# Patient Record
Sex: FEMALE | Race: WHITE | NOT HISPANIC OR LATINO
[De-identification: names, ages, dates, MRNs, and addresses within clinical notes are randomized per-mention and may not be internally consistent; named-entity substitution may affect disease eponyms.]

---

## 2020-07-20 PROBLEM — Z00.00 ENCOUNTER FOR PREVENTIVE HEALTH EXAMINATION: Status: ACTIVE | Noted: 2020-07-20

## 2020-07-21 ENCOUNTER — APPOINTMENT (OUTPATIENT)
Dept: ENDOCRINOLOGY | Facility: CLINIC | Age: 60
End: 2020-07-21
Payer: MEDICAID

## 2020-07-21 VITALS — HEIGHT: 66.5 IN | OXYGEN SATURATION: 98 % | HEART RATE: 78 BPM | WEIGHT: 293 LBS | BODY MASS INDEX: 46.53 KG/M2

## 2020-07-21 DIAGNOSIS — Z83.3 FAMILY HISTORY OF DIABETES MELLITUS: ICD-10-CM

## 2020-07-21 DIAGNOSIS — Z72.3 LACK OF PHYSICAL EXERCISE: ICD-10-CM

## 2020-07-21 DIAGNOSIS — Z80.9 FAMILY HISTORY OF MALIGNANT NEOPLASM, UNSPECIFIED: ICD-10-CM

## 2020-07-21 DIAGNOSIS — Z60.2 PROBLEMS RELATED TO LIVING ALONE: ICD-10-CM

## 2020-07-21 DIAGNOSIS — Z63.4 DISAPPEARANCE AND DEATH OF FAMILY MEMBER: ICD-10-CM

## 2020-07-21 LAB — GLUCOSE BLDC GLUCOMTR-MCNC: 293

## 2020-07-21 PROCEDURE — 99205 OFFICE O/P NEW HI 60 MIN: CPT | Mod: 25

## 2020-07-21 PROCEDURE — 82962 GLUCOSE BLOOD TEST: CPT

## 2020-07-21 RX ORDER — ATORVASTATIN CALCIUM 80 MG/1
TABLET, FILM COATED ORAL
Refills: 0 | Status: ACTIVE | COMMUNITY

## 2020-07-21 SDOH — SOCIAL STABILITY - SOCIAL INSECURITY: PROBLEMS RELATED TO LIVING ALONE: Z60.2

## 2020-07-21 SDOH — SOCIAL STABILITY - SOCIAL INSECURITY: DISSAPEARANCE AND DEATH OF FAMILY MEMBER: Z63.4

## 2020-07-22 PROBLEM — Z83.3 FAMILY HISTORY OF DIABETES MELLITUS: Status: ACTIVE | Noted: 2020-07-21

## 2020-07-22 PROBLEM — Z80.9 FAMILY HISTORY OF MALIGNANT NEOPLASM: Status: ACTIVE | Noted: 2020-07-21

## 2020-07-22 PROBLEM — Z60.2 LIVES ALONE: Status: ACTIVE | Noted: 2020-07-21

## 2020-07-22 PROBLEM — Z63.4 WIDOWED: Status: ACTIVE | Noted: 2020-07-21

## 2020-07-22 PROBLEM — Z72.3 DOES NOT EXERCISE: Status: ACTIVE | Noted: 2020-07-21

## 2020-07-22 LAB — HBA1C MFR BLD HPLC: 11

## 2020-07-22 NOTE — HISTORY OF PRESENT ILLNESS
[FreeTextEntry1] : Cecilia Santana is a 60 yr old female, who presents today for a follow up/consultation in regards type 2 diabetes.  \par Per patient , has been diabetic since 10 years.\par She says for long time she did not take good care of her diabetes, she knows what to do but she has not been careful.\par Now she has surgery coming up and wants her sugars to be better before that.\par \par Currently taking lanuts 45 units daily, novolog on sliding scale, 4 to 14 units with each meals.\par admits to missing insulin doses a lot.\par A1C 11% in 7/2020\par \par \par Home Glucose Monitoring\par Type: meter type 4 x day\par BG Readings -   Fasting 250s to 260\par                              Lunch   300s\par                              Dinner 300s\par                               HS        300s\par no lows\par Says her diet is getting better.Cut back on bread, rice. \par Since 2 weeks working on diet.\par \par Diabetic History\par ER Visits:  none\par Hospitalizations:   none\par Diet Therapy: now watching his diet\par Diabetic Education:   no\par Exercise:   not active, admits to being lazy\par Last eye exam: gets yearly, no retinopathy per patient, last one was 1 years, next one coming up .\par \par \par \par \par

## 2020-07-22 NOTE — REVIEW OF SYSTEMS
[Decreased Appetite] : appetite not decreased [Recent Weight Gain (___ Lbs)] : no recent weight gain [Fatigue] : no fatigue [Recent Weight Loss (___ Lbs)] : no recent weight loss [Visual Field Defect] : no visual field defect [Dry Eyes] : no dryness [Neck Pain] : no neck pain [Dysphonia] : no dysphonia [Dysphagia] : no dysphagia [Palpitations] : no palpitations [Chest Pain] : no chest pain [Lower Ext Edema] : no lower extremity edema [Wheezing] : no wheezing [Cough] : no cough [Shortness Of Breath] : no shortness of breath [Constipation] : no constipation [Nausea] : no nausea [Abdominal Pain] : no abdominal pain [Diarrhea] : no diarrhea [Polyuria] : no polyuria [Vomiting] : no vomiting [Muscle Weakness] : no muscle weakness [Joint Pain] : no joint pain [Dysuria] : no dysuria [Myalgia] : no myalgia  [Dizziness] : no dizziness [Headaches] : no headaches [Tremors] : no tremors [Depression] : no depression [Insomnia] : no insomnia [Anxiety] : no anxiety [Easy Bleeding] : no ~M tendency for easy bleeding [Easy Bruising] : no tendency for easy bruising

## 2020-07-22 NOTE — PHYSICAL EXAM
[Alert] : alert [Well Nourished] : well nourished [No Proptosis] : no proptosis [Normal Sclera/Conjunctiva] : normal sclera/conjunctiva [No Acute Distress] : no acute distress [No Neck Mass] : no neck mass was observed [No Lid Lag] : no lid lag [No LAD] : no lymphadenopathy [Thyroid Not Enlarged] : the thyroid was not enlarged [No Respiratory Distress] : no respiratory distress [No Thyroid Nodules] : no palpable thyroid nodules [Clear to Auscultation] : lungs were clear to auscultation bilaterally [No Accessory Muscle Use] : no accessory muscle use [Normal Rate and Effort] : normal respiratory rate and effort [No Murmurs] : no murmurs [Normal S1, S2] : normal S1 and S2 [Normal Rate] : heart rate was normal [Regular Rhythm] : with a regular rhythm [Normal Bowel Sounds] : normal bowel sounds [Not Tender] : non-tender [Normal Supraclavicular Nodes] : no supraclavicular lymphadenopathy [Soft] : abdomen soft [Not Distended] : not distended [Normal Posterior Cervical Nodes] : no posterior cervical lymphadenopathy [Normal Anterior Cervical Nodes] : no anterior cervical lymphadenopathy [No Stigmata of Cushings Syndrome] : no stigmata of Cushings Syndrome [Normal Gait] : normal gait [No Joint Swelling] : no joint swelling seen [No Clubbing, Cyanosis] : no clubbing  or cyanosis of the fingernails [No Tremors] : no tremors [No Sensory Deficits] : the sensory exam was normal to light touch and pinprick [No Motor Deficits] : the motor exam was normal [Oriented x3] : oriented to person, place, and time [Normal Affect] : the affect was normal [Normal Insight/Judgement] : insight and judgment were intact [Normal Mood] : the mood was normal [Obese] : obese [Left Foot Was Examined] : left foot ~C was examined [Right Foot Was Examined] : right foot ~C was examined [Normal] : normal [Diminished Throughout Both Feet] : normal tactile sensation with monofilament testing throughout both feet

## 2020-07-22 NOTE — ASSESSMENT
[FreeTextEntry1] : Cecilia Santana is a 60 yr old female, who presents today for consultation in regards type 2 diabetes, under poor control.  \par Currently taking lanuts 45 units daily, novolog on sliding scale, 4 to 14 units with each meals.\par admits to missing insulin doses a lot.\par A1C 11% in 7/2020  she has surgery coming up and wants her sugars to be better before that.\par \par Medication changes:\par To take lantus 50 units daily, novolog 10 units each meal\par To check sugars 4 times a day, morning, bedtime and before each meal.\par Bring log to next visit.\par For every 50 points above 150 take 2 extra units as below\par  To check sugars 4 x a day  at different times.\par Diet and exercise reviewed.\par Hypoglycemia reviewed.\par To see CDE in 2 to 3 weeks for furthewr dose titration.\par \par  Eyes:no  active issues, up to date for exams. coming up for this year\par Feet: no  active issues, no  neuropathy. Diabetic foot care reviewed.\par \par  Lipids: dyslipidemia: on  statin/ anti-lipid treatment. Last LDL:  not known, will check next visit , once sugars are better\par  Renal/ B/P : B/P wnl, on ACE/ ARB. will check for  proteinuria.  \par Weight:  obese  Balanced diet and exercise reviewed.\par \par  EDUCATION: ADA diet (30-50 gm carbohydrates with each meal, 15 grams with snacks. Balance with lean proteins and low fat diet.) Exercise daily per ability, work up to 30 minutes a day. Medication, risks and benefits reviewed. Glucose testing and insulin administration for glycemic management.\par  Influenza vaccination \par \par FOLLOWUP@3 months\par

## 2020-08-03 ENCOUNTER — APPOINTMENT (OUTPATIENT)
Dept: ENDOCRINOLOGY | Facility: CLINIC | Age: 60
End: 2020-08-03

## 2020-08-18 ENCOUNTER — APPOINTMENT (OUTPATIENT)
Dept: ENDOCRINOLOGY | Facility: CLINIC | Age: 60
End: 2020-08-18
Payer: MEDICAID

## 2020-08-18 PROCEDURE — G0108 DIAB MANAGE TRN  PER INDIV: CPT

## 2020-09-13 ENCOUNTER — NON-APPOINTMENT (OUTPATIENT)
Age: 60
End: 2020-09-13

## 2020-09-17 RX ORDER — INSULIN GLARGINE 100 [IU]/ML
100 INJECTION, SOLUTION SUBCUTANEOUS
Refills: 0 | Status: DISCONTINUED | COMMUNITY
End: 2020-09-17

## 2020-09-17 RX ORDER — INSULIN ASPART 100 [IU]/ML
100 INJECTION, SOLUTION INTRAVENOUS; SUBCUTANEOUS
Qty: 6 | Refills: 0 | Status: DISCONTINUED | COMMUNITY
Start: 2020-08-18 | End: 2020-09-17

## 2020-09-17 RX ORDER — INSULIN GLARGINE 100 [IU]/ML
100 INJECTION, SOLUTION SUBCUTANEOUS
Qty: 5 | Refills: 0 | Status: DISCONTINUED | COMMUNITY
Start: 2020-08-18 | End: 2020-09-17

## 2020-09-22 ENCOUNTER — APPOINTMENT (OUTPATIENT)
Dept: ENDOCRINOLOGY | Facility: CLINIC | Age: 60
End: 2020-09-22
Payer: MEDICAID

## 2020-09-22 PROCEDURE — G0108 DIAB MANAGE TRN  PER INDIV: CPT

## 2020-09-22 RX ORDER — PEN NEEDLE, DIABETIC 32GX 5/32"
32G X 4 MM NEEDLE, DISPOSABLE MISCELLANEOUS
Qty: 90 | Refills: 0 | Status: DISCONTINUED | COMMUNITY
Start: 2020-09-17 | End: 2020-09-22

## 2020-09-22 RX ORDER — INSULIN DEGLUDEC INJECTION 100 U/ML
100 INJECTION, SOLUTION SUBCUTANEOUS
Qty: 5 | Refills: 0 | Status: DISCONTINUED | COMMUNITY
Start: 2020-09-17 | End: 2020-09-22

## 2020-10-05 ENCOUNTER — APPOINTMENT (OUTPATIENT)
Dept: ENDOCRINOLOGY | Facility: CLINIC | Age: 60
End: 2020-10-05

## 2020-10-20 ENCOUNTER — APPOINTMENT (OUTPATIENT)
Dept: ENDOCRINOLOGY | Facility: CLINIC | Age: 60
End: 2020-10-20

## 2020-11-03 ENCOUNTER — APPOINTMENT (OUTPATIENT)
Dept: ENDOCRINOLOGY | Facility: CLINIC | Age: 60
End: 2020-11-03

## 2020-12-18 ENCOUNTER — RX CHANGE (OUTPATIENT)
Age: 60
End: 2020-12-18

## 2020-12-21 ENCOUNTER — RX CHANGE (OUTPATIENT)
Age: 60
End: 2020-12-21

## 2020-12-29 ENCOUNTER — RX CHANGE (OUTPATIENT)
Age: 60
End: 2020-12-29

## 2021-01-31 ENCOUNTER — RX RENEWAL (OUTPATIENT)
Age: 61
End: 2021-01-31

## 2021-03-15 ENCOUNTER — RX RENEWAL (OUTPATIENT)
Age: 61
End: 2021-03-15

## 2021-04-12 ENCOUNTER — APPOINTMENT (OUTPATIENT)
Dept: ENDOCRINOLOGY | Facility: CLINIC | Age: 61
End: 2021-04-12

## 2021-06-08 ENCOUNTER — RX RENEWAL (OUTPATIENT)
Age: 61
End: 2021-06-08

## 2021-07-06 ENCOUNTER — APPOINTMENT (OUTPATIENT)
Dept: ENDOCRINOLOGY | Facility: CLINIC | Age: 61
End: 2021-07-06
Payer: MEDICAID

## 2021-07-06 DIAGNOSIS — E66.9 OBESITY, UNSPECIFIED: ICD-10-CM

## 2021-07-06 PROCEDURE — 99214 OFFICE O/P EST MOD 30 MIN: CPT | Mod: 95

## 2021-07-06 RX ORDER — BLOOD SUGAR DIAGNOSTIC
STRIP MISCELLANEOUS
Qty: 100 | Refills: 0 | Status: ACTIVE | COMMUNITY
Start: 2021-06-21

## 2021-07-06 NOTE — REVIEW OF SYSTEMS
[Fatigue] : no fatigue [Decreased Appetite] : appetite not decreased [Recent Weight Gain (___ Lbs)] : no recent weight gain [Recent Weight Loss (___ Lbs)] : no recent weight loss [Visual Field Defect] : no visual field defect [Dry Eyes] : no dryness [Dysphagia] : no dysphagia [Neck Pain] : no neck pain [Dysphonia] : no dysphonia [Chest Pain] : no chest pain [Palpitations] : no palpitations [Lower Ext Edema] : no lower extremity edema [Shortness Of Breath] : no shortness of breath [Cough] : no cough [Wheezing] : no wheezing [Nausea] : no nausea [Constipation] : no constipation [Abdominal Pain] : no abdominal pain [Vomiting] : no vomiting [Diarrhea] : no diarrhea [Polyuria] : no polyuria [Dysuria] : no dysuria [Joint Pain] : no joint pain [Muscle Weakness] : no muscle weakness [Myalgia] : no myalgia  [Headaches] : no headaches [Dizziness] : no dizziness [Tremors] : no tremors [Depression] : no depression [Insomnia] : no insomnia [Anxiety] : no anxiety [Easy Bleeding] : no ~M tendency for easy bleeding [Easy Bruising] : no tendency for easy bruising

## 2021-07-06 NOTE — HISTORY OF PRESENT ILLNESS
[Home] : at home, [unfilled] , at the time of the visit. [Medical Office: ([facility])___] : at the medical office located in  [Verbal consent obtained from patient] : the patient, [unfilled] [FreeTextEntry1] : Cecilia Santana is a 60 yr old female, who presents today for a follow up in regards type 2 diabetes.  \par \par ''This visit was provided via telehealth video  visit, patient was located at her home.\par  I was located  in  my office  at [facility], [place].\par verbal  consent was given by patient, and patient agreed/requested  to the telehealth visit.''\par \par She tells me recently she was diagnosed with uterine cancer.\par \par \par Per patient , has been diabetic since 10 years.\par She says for long time she did not take good care of her diabetes, she knows what to do but she has not been careful.\par Now she has surgery coming up and wants her sugars to be better before that.\par \par Currently taking basaglar 50 units daily, admelog  20 units with each meals plus sliding scale.\par A1C 11% in 7/2020, per patient recent a1c was round 10% she is not sure.\par \par \par Home Glucose Monitoring\par reports checking  4 x day\par BG Readings -   Fasting 200s to 260s\par                              Lunch  200s\par                              Dinner 150s to 220s\par                               HS        200s\par no lows\par Says her diet is getting better.Cut back on bread, rice. \par \par Diabetic History\par ER Visits:  none\par Hospitalizations:   none\par Diet Therapy: now watching his diet\par Diabetic Education:   no\par Exercise:   not active, admits to being lazy\par Last eye exam: gets yearly, has some  retinopathy per patient, last one was 7 months ago.\par \par \par \par \par

## 2021-07-06 NOTE — ASSESSMENT
[FreeTextEntry1] : Cecilia Santana is a 60 yr old female, who presents today for a follow up in regards type 2 diabetes.  \par \par ''This visit was provided via telehealth video  visit, patient was located at her home.\par  I was located  in  my office  at [facility].\par verbal  consent was given by patient, and patient agreed/requested  to the telehealth visit.''\par \par She tells me recently she was diagnosed with uterine cancer.\par \par Per patient , has been diabetic since 10 years.\par She says for long time she did not take good care of her diabetes, she knows what to do but she has not been careful.\par Now she has surgery coming up and wants her sugars to be better before that.\par \par Currently taking basaglar 50 units daily, admelog  20 units with each meals plus sliding scale.\par A1C 11% in 7/2020, per patient recent a1c was round 10% she is not sure.\par Sugars still high, recommended to see us regularly so we can help her adjust insulin doses.\par Discussed risks of high sugars.\par Already has retinopathy.\par \par Medication changes:\par To take basaglar 60  units daily, admelog 25 units each meal \par To check sugars 4 times a day, morning, bedtime and before each meal.\par To see CDE in 2 to 3 weeks for further dose titration.\par Bring log to next visit.\par For every 50 points above 150 take 2 extra units as below\par  To check sugars 4 x a day  at different times.\par Diet and exercise reviewed.\par Hypoglycemia reviewed.\par \par \par  Eyes:no  active issues, up to date for exams. has retinopathy.\par Feet: no  active issues, no  neuropathy. Diabetic foot care reviewed.\par \par  Lipids: dyslipidemia: on  statin/ anti-lipid treatment. Last LDL:  not known, will check this  visit \par  Renal/ B/P : B/P wnl, on ACE/ ARB. will check for  proteinuria.  \par Weight:  obese  Balanced diet and exercise reviewed.\par \par  EDUCATION: ADA diet (30-50 gm carbohydrates with each meal, 15 grams with snacks. Balance with lean proteins and low fat diet.) Exercise daily per ability, work up to 30 minutes a day. Medication, risks and benefits reviewed. Glucose testing and insulin administration for glycemic management.\par  Influenza vaccination \par \par FOLLOWUP@3 months\par

## 2021-07-21 ENCOUNTER — RX RENEWAL (OUTPATIENT)
Age: 61
End: 2021-07-21

## 2021-08-27 ENCOUNTER — RX RENEWAL (OUTPATIENT)
Age: 61
End: 2021-08-27

## 2021-12-15 ENCOUNTER — APPOINTMENT (OUTPATIENT)
Dept: ENDOCRINOLOGY | Facility: CLINIC | Age: 61
End: 2021-12-15

## 2022-03-09 ENCOUNTER — APPOINTMENT (OUTPATIENT)
Dept: ENDOCRINOLOGY | Facility: CLINIC | Age: 62
End: 2022-03-09

## 2022-06-06 ENCOUNTER — RX RENEWAL (OUTPATIENT)
Age: 62
End: 2022-06-06

## 2022-08-31 ENCOUNTER — NON-APPOINTMENT (OUTPATIENT)
Age: 62
End: 2022-08-31

## 2022-09-01 RX ORDER — PEN NEEDLE, DIABETIC 32GX 5/32"
32G X 4 MM NEEDLE, DISPOSABLE MISCELLANEOUS
Qty: 100 | Refills: 0 | Status: ACTIVE | COMMUNITY
Start: 2022-09-01 | End: 1900-01-01

## 2022-09-01 RX ORDER — SYRINGE-NEEDLE,INSULIN,0.5 ML 31GX15/64"
31G X 15/64" SYRINGE, EMPTY DISPOSABLE MISCELLANEOUS
Qty: 4 | Refills: 0 | Status: DISCONTINUED | COMMUNITY
Start: 2020-08-18 | End: 2022-09-01

## 2022-09-01 RX ORDER — INSULIN ASPART 100 [IU]/ML
100 INJECTION, SOLUTION INTRAVENOUS; SUBCUTANEOUS
Qty: 20 | Refills: 3 | Status: DISCONTINUED | COMMUNITY
Start: 2020-09-22 | End: 2022-09-01

## 2022-09-01 RX ORDER — INSULIN GLARGINE 100 [IU]/ML
100 INJECTION, SOLUTION SUBCUTANEOUS
Qty: 2 | Refills: 2 | Status: DISCONTINUED | COMMUNITY
Start: 2020-09-22 | End: 2022-09-01

## 2022-09-01 RX ORDER — PEN NEEDLE, DIABETIC 29 G X1/2"
31G X 8 MM NEEDLE, DISPOSABLE MISCELLANEOUS
Qty: 200 | Refills: 3 | Status: DISCONTINUED | COMMUNITY
Start: 2020-12-21 | End: 2022-09-01

## 2022-09-13 ENCOUNTER — APPOINTMENT (OUTPATIENT)
Dept: ENDOCRINOLOGY | Facility: CLINIC | Age: 62
End: 2022-09-13

## 2023-07-11 ENCOUNTER — APPOINTMENT (OUTPATIENT)
Dept: ENDOCRINOLOGY | Facility: CLINIC | Age: 63
End: 2023-07-11
Payer: MEDICAID

## 2023-07-11 ENCOUNTER — RESULT CHARGE (OUTPATIENT)
Age: 63
End: 2023-07-11

## 2023-07-11 VITALS
HEIGHT: 66.5 IN | OXYGEN SATURATION: 98 % | WEIGHT: 293 LBS | HEART RATE: 100 BPM | BODY MASS INDEX: 46.53 KG/M2 | SYSTOLIC BLOOD PRESSURE: 122 MMHG | DIASTOLIC BLOOD PRESSURE: 84 MMHG

## 2023-07-11 DIAGNOSIS — Z85.42 PERSONAL HISTORY OF MALIGNANT NEOPLASM OF OTHER PARTS OF UTERUS: ICD-10-CM

## 2023-07-11 LAB — GLUCOSE BLDC GLUCOMTR-MCNC: 314

## 2023-07-11 PROCEDURE — 99215 OFFICE O/P EST HI 40 MIN: CPT

## 2023-07-11 RX ORDER — INSULIN GLARGINE 100 [IU]/ML
100 INJECTION, SOLUTION SUBCUTANEOUS
Qty: 1 | Refills: 1 | Status: DISCONTINUED | COMMUNITY
Start: 2020-09-17 | End: 2023-07-11

## 2023-07-11 RX ORDER — INSULIN LISPRO 100 U/ML
100 INJECTION, SOLUTION SUBCUTANEOUS
Qty: 1 | Refills: 0 | Status: DISCONTINUED | COMMUNITY
Start: 2020-12-18 | End: 2023-07-11

## 2023-07-11 RX ORDER — INSULIN ASPART 100 [IU]/ML
100 INJECTION, SOLUTION INTRAVENOUS; SUBCUTANEOUS
Refills: 0 | Status: DISCONTINUED | COMMUNITY
End: 2023-07-11

## 2023-07-11 RX ORDER — FLUTICASONE PROPIONATE 50 UG/1
50 SPRAY, METERED NASAL
Qty: 16 | Refills: 0 | Status: DISCONTINUED | COMMUNITY
Start: 2021-01-08 | End: 2023-07-11

## 2023-07-11 RX ORDER — ALBUTEROL SULFATE 90 UG/1
108 (90 BASE) INHALANT RESPIRATORY (INHALATION)
Qty: 8 | Refills: 0 | Status: DISCONTINUED | COMMUNITY
Start: 2021-01-08 | End: 2023-07-11

## 2023-07-11 RX ORDER — AMLODIPINE BESYLATE 5 MG/1
TABLET ORAL
Refills: 0 | Status: DISCONTINUED | COMMUNITY
End: 2023-07-11

## 2023-07-11 RX ORDER — PEN NEEDLE, DIABETIC 32GX 5/32"
32G X 4 MM NEEDLE, DISPOSABLE MISCELLANEOUS
Qty: 100 | Refills: 1 | Status: ACTIVE | COMMUNITY
Start: 2023-07-11 | End: 1900-01-01

## 2023-07-11 NOTE — HISTORY OF PRESENT ILLNESS
[FreeTextEntry1] : Here to resume care for uncontrolled Type 2 DM. \par Last seen in 2021.\par Returns as she was recently found to have retinopathy with macular edema and needs a procedure done tomorrow. She is now motivated to improve her diabetes care.\par She does admit for the past 8-9 months she has been off all of her insulin and not testing her blood glucose.  \par \par treated for endometrial cancer  with XRT one year ago. \par \par Quality:  Type 2 DM\par Severity:  uncontrolled\par Duration of diabetes: over 10 years\par Associated Complications/ Symptoms:  retinopathy\par Modifying Factors:  Better with medication\par \par SMBG:  not testing BG currently.  \par \par Current Diabetic Medication Regimen:\par Was previously on basal- bolus insulin.\par Can not tolerate metformin due to diarrhea.   \par \par \par \par \par

## 2023-07-11 NOTE — PHYSICAL EXAM
[Obese] : obese [No Acute Distress] : no acute distress [Normal Sclera/Conjunctiva] : normal sclera/conjunctiva [No Proptosis] : no proptosis [No Neck Mass] : no neck mass was observed [No LAD] : no lymphadenopathy [Supple] : the neck was supple [Thyroid Not Enlarged] : the thyroid was not enlarged [No Thyroid Nodules] : no palpable thyroid nodules [No Respiratory Distress] : no respiratory distress [Clear to Auscultation] : lungs were clear to auscultation bilaterally [Normal S1, S2] : normal S1 and S2 [No Murmurs] : no murmurs [Normal Rate] : heart rate was normal [Regular Rhythm] : with a regular rhythm [No Tremors] : no tremors [Normal Affect] : the affect was normal [Normal Insight/Judgement] : insight and judgment were intact [Normal Mood] : the mood was normal [Acanthosis Nigricans] : no acanthosis nigricans [de-identified] : t

## 2023-07-11 NOTE — ASSESSMENT
[FreeTextEntry1] : 63 year old female with PMH of Type 2 DM with associated retinopathy, HTN, HLD, Obesity and endometrial cancer here for follow up of uncontrolled DM.  \par \par 1. Type 2 DM-  check labs now.  Resume Basaglar 30 units qhs.  Will start Ozempic 0.25 mg qweek for 4 weeks, then increase to 0.5 mg qweek.  Resume SMBG and test 4 times a day.  She is interested in CGM.  Will arrange for diabetes education.   Discussed risks of uncontrolled DM in detail and encouraged patient to increase compliance with diet, monitoring, medication and follow up.  \par \par 2.  HTN-  continue losartan.  Patient to call back with dose.\par 3.  HLD-  continue Atorvastatin.  Patient to call back with current dose. \par \par Follow up in 3 months.   \par \par \par Spent 45 minutes on this encounter with significant time spent counseling patient on DM care and reviewing chart and documenting.

## 2023-07-18 ENCOUNTER — APPOINTMENT (OUTPATIENT)
Dept: ENDOCRINOLOGY | Facility: CLINIC | Age: 63
End: 2023-07-18

## 2023-11-08 ENCOUNTER — APPOINTMENT (OUTPATIENT)
Dept: ENDOCRINOLOGY | Facility: CLINIC | Age: 63
End: 2023-11-08

## 2024-03-27 ENCOUNTER — APPOINTMENT (OUTPATIENT)
Dept: ENDOCRINOLOGY | Facility: CLINIC | Age: 64
End: 2024-03-27
Payer: MEDICAID

## 2024-03-27 VITALS
OXYGEN SATURATION: 98 % | RESPIRATION RATE: 18 BRPM | BODY MASS INDEX: 46.53 KG/M2 | HEIGHT: 66.5 IN | SYSTOLIC BLOOD PRESSURE: 134 MMHG | WEIGHT: 293 LBS | DIASTOLIC BLOOD PRESSURE: 88 MMHG | HEART RATE: 108 BPM

## 2024-03-27 DIAGNOSIS — Z79.4 TYPE 2 DIABETES MELLITUS WITH HYPERGLYCEMIA: ICD-10-CM

## 2024-03-27 DIAGNOSIS — E11.65 TYPE 2 DIABETES MELLITUS WITH HYPERGLYCEMIA: ICD-10-CM

## 2024-03-27 DIAGNOSIS — Z86.73 PERSONAL HISTORY OF TRANSIENT ISCHEMIC ATTACK (TIA), AND CEREBRAL INFARCTION W/OUT RESIDUAL DEFICITS: ICD-10-CM

## 2024-03-27 DIAGNOSIS — E78.5 HYPERLIPIDEMIA, UNSPECIFIED: ICD-10-CM

## 2024-03-27 DIAGNOSIS — I10 ESSENTIAL (PRIMARY) HYPERTENSION: ICD-10-CM

## 2024-03-27 LAB — GLUCOSE BLDC GLUCOMTR-MCNC: 346

## 2024-03-27 PROCEDURE — 99214 OFFICE O/P EST MOD 30 MIN: CPT

## 2024-03-27 PROCEDURE — 82962 GLUCOSE BLOOD TEST: CPT

## 2024-03-27 RX ORDER — LOSARTAN POTASSIUM 100 MG/1
TABLET, FILM COATED ORAL
Refills: 0 | Status: DISCONTINUED | COMMUNITY
End: 2024-03-27

## 2024-03-27 RX ORDER — ATORVASTATIN CALCIUM 40 MG/1
40 TABLET, FILM COATED ORAL
Refills: 0 | Status: ACTIVE | COMMUNITY

## 2024-03-27 RX ORDER — AMLODIPINE BESYLATE 5 MG/1
5 TABLET ORAL
Refills: 0 | Status: ACTIVE | COMMUNITY

## 2024-03-27 RX ORDER — SEMAGLUTIDE 0.68 MG/ML
2 INJECTION, SOLUTION SUBCUTANEOUS
Qty: 3 | Refills: 1 | Status: DISCONTINUED | COMMUNITY
Start: 2023-07-11 | End: 2024-03-27

## 2024-03-27 RX ORDER — LOSARTAN POTASSIUM 100 MG/1
100 TABLET, FILM COATED ORAL
Refills: 0 | Status: ACTIVE | COMMUNITY

## 2024-03-27 NOTE — HISTORY OF PRESENT ILLNESS
[FreeTextEntry1] : Follow up Type 2 DM.  PMH of endometrial cancer  with XRT one year ago.  Recently hospitalized with TIA.    Quality:  Type 2 DM Severity:  uncontrolled Duration of diabetes: over 10 years Associated Complications/ Symptoms:  retinopathy Modifying Factors:  Better with medication  SMBG:  not testing BG currently.    Current Diabetic Medication Regimen: Basaglar 30 units qhs Ozempic 0.5 mg daily Can not tolerate metformin due to diarrhea.

## 2024-03-27 NOTE — ASSESSMENT
[FreeTextEntry1] : 63 year old female with PMH of Type 2 DM with associated retinopathy, HTN, HLD, Obesity and endometrial cancer here for follow up of uncontrolled DM.  1. Type 2 DM-   Check labs now.  Increase Ozempic to 1 mg qweek.  Resume SMBG, 2. HTN- continue losartan and Amlodipine.  3. HLD- continue Atorvastatin.    Follow up in 4 months.

## 2024-03-27 NOTE — DATA REVIEWED
[FreeTextEntry1] : LABS: 3/9/2024: Glucose 229 Creatinine  0.62  7/15/2020: A1c 11% Creatinine 0.72

## 2024-03-27 NOTE — PHYSICAL EXAM
[No Acute Distress] : no acute distress [Obese] : obese [Normal Sclera/Conjunctiva] : normal sclera/conjunctiva [No Proptosis] : no proptosis [No Neck Mass] : no neck mass was observed [No LAD] : no lymphadenopathy [Supple] : the neck was supple [Thyroid Not Enlarged] : the thyroid was not enlarged [No Thyroid Nodules] : no palpable thyroid nodules [No Respiratory Distress] : no respiratory distress [Clear to Auscultation] : lungs were clear to auscultation bilaterally [Normal S1, S2] : normal S1 and S2 [No Murmurs] : no murmurs [Normal Rate] : heart rate was normal [Regular Rhythm] : with a regular rhythm [Acanthosis Nigricans] : no acanthosis nigricans [Normal Affect] : the affect was normal [Normal Insight/Judgement] : insight and judgment were intact [Normal Mood] : the mood was normal

## 2024-04-22 RX ORDER — INSULIN GLARGINE 100 [IU]/ML
100 INJECTION, SOLUTION SUBCUTANEOUS
Qty: 2 | Refills: 1 | Status: ACTIVE | COMMUNITY
Start: 2023-07-11 | End: 1900-01-01

## 2024-04-22 RX ORDER — SEMAGLUTIDE 1.34 MG/ML
4 INJECTION, SOLUTION SUBCUTANEOUS
Qty: 3 | Refills: 1 | Status: ACTIVE | COMMUNITY
Start: 2024-03-27 | End: 1900-01-01

## 2024-08-19 ENCOUNTER — NON-APPOINTMENT (OUTPATIENT)
Age: 64
End: 2024-08-19

## 2024-10-28 ENCOUNTER — NON-APPOINTMENT (OUTPATIENT)
Age: 64
End: 2024-10-28

## 2024-12-09 ENCOUNTER — APPOINTMENT (OUTPATIENT)
Dept: ENDOCRINOLOGY | Facility: CLINIC | Age: 64
End: 2024-12-09